# Patient Record
Sex: MALE | Race: WHITE | Employment: UNEMPLOYED | URBAN - METROPOLITAN AREA
[De-identification: names, ages, dates, MRNs, and addresses within clinical notes are randomized per-mention and may not be internally consistent; named-entity substitution may affect disease eponyms.]

---

## 2020-11-18 ENCOUNTER — ANESTHESIA EVENT (OUTPATIENT)
Dept: SURGERY | Age: 65
End: 2020-11-18
Payer: OTHER MISCELLANEOUS

## 2020-11-18 RX ORDER — CEFAZOLIN SODIUM/WATER 2 G/20 ML
2 SYRINGE (ML) INTRAVENOUS ONCE
Status: CANCELLED | OUTPATIENT
Start: 2020-11-18 | End: 2020-11-18

## 2020-11-20 ENCOUNTER — ANESTHESIA (OUTPATIENT)
Dept: SURGERY | Age: 65
End: 2020-11-20
Payer: OTHER MISCELLANEOUS

## 2020-11-20 ENCOUNTER — APPOINTMENT (OUTPATIENT)
Dept: GENERAL RADIOLOGY | Age: 65
End: 2020-11-20
Attending: ORTHOPAEDIC SURGERY
Payer: OTHER MISCELLANEOUS

## 2020-11-20 ENCOUNTER — HOSPITAL ENCOUNTER (OUTPATIENT)
Age: 65
Setting detail: OUTPATIENT SURGERY
Discharge: HOME | End: 2020-11-20
Attending: ORTHOPAEDIC SURGERY | Admitting: ORTHOPAEDIC SURGERY
Payer: OTHER MISCELLANEOUS

## 2020-11-20 VITALS
DIASTOLIC BLOOD PRESSURE: 78 MMHG | TEMPERATURE: 97.7 F | HEART RATE: 73 BPM | RESPIRATION RATE: 16 BRPM | SYSTOLIC BLOOD PRESSURE: 129 MMHG | OXYGEN SATURATION: 92 %

## 2020-11-20 PROCEDURE — 77030041490 HC NDL KT BN MRW ASPIR CLNG -B: Performed by: ORTHOPAEDIC SURGERY

## 2020-11-20 PROCEDURE — C1769 GUIDE WIRE: HCPCS | Performed by: ORTHOPAEDIC SURGERY

## 2020-11-20 PROCEDURE — 77030013921: Performed by: ORTHOPAEDIC SURGERY

## 2020-11-20 PROCEDURE — 77030006788 HC BLD SAW OSC STRY -B: Performed by: ORTHOPAEDIC SURGERY

## 2020-11-20 PROCEDURE — 74011000250 HC RX REV CODE- 250: Performed by: ORTHOPAEDIC SURGERY

## 2020-11-20 PROCEDURE — C1713 ANCHOR/SCREW BN/BN,TIS/BN: HCPCS | Performed by: ORTHOPAEDIC SURGERY

## 2020-11-20 PROCEDURE — 2709999900 HC NON-CHARGEABLE SUPPLY: Performed by: ORTHOPAEDIC SURGERY

## 2020-11-20 PROCEDURE — 77030002933 HC SUT MCRYL J&J -A: Performed by: ORTHOPAEDIC SURGERY

## 2020-11-20 PROCEDURE — 77030002916 HC SUT ETHLN J&J -A: Performed by: ORTHOPAEDIC SURGERY

## 2020-11-20 PROCEDURE — 74011250636 HC RX REV CODE- 250/636: Performed by: ORTHOPAEDIC SURGERY

## 2020-11-20 PROCEDURE — 74011250636 HC RX REV CODE- 250/636: Performed by: ANESTHESIOLOGY

## 2020-11-20 PROCEDURE — 76060000035 HC ANESTHESIA 2 TO 2.5 HR: Performed by: ORTHOPAEDIC SURGERY

## 2020-11-20 PROCEDURE — 77030000032 HC CUF TRNQT ZIMM -B: Performed by: ORTHOPAEDIC SURGERY

## 2020-11-20 PROCEDURE — 74011000250 HC RX REV CODE- 250: Performed by: NURSE ANESTHETIST, CERTIFIED REGISTERED

## 2020-11-20 PROCEDURE — 76210000063 HC OR PH I REC FIRST 0.5 HR: Performed by: ORTHOPAEDIC SURGERY

## 2020-11-20 PROCEDURE — 77030037713 HC CLOSR DEV INCIS ZIP STRY -B: Performed by: ORTHOPAEDIC SURGERY

## 2020-11-20 PROCEDURE — 76010000162 HC OR TIME 1.5 TO 2 HR INTENSV-TIER 1: Performed by: ORTHOPAEDIC SURGERY

## 2020-11-20 PROCEDURE — 76210000021 HC REC RM PH II 0.5 TO 1 HR: Performed by: ORTHOPAEDIC SURGERY

## 2020-11-20 PROCEDURE — 77030016449 HC BIT DRL AO1 STRY -C: Performed by: ORTHOPAEDIC SURGERY

## 2020-11-20 PROCEDURE — 76010010054 HC POST OP PAIN BLOCK: Performed by: ORTHOPAEDIC SURGERY

## 2020-11-20 PROCEDURE — 77030026245 HC BIT DRL AO S2 STRY -C: Performed by: ORTHOPAEDIC SURGERY

## 2020-11-20 PROCEDURE — 77030014383 HC ENDCAP SUPCNDYL STRY -C: Performed by: ORTHOPAEDIC SURGERY

## 2020-11-20 PROCEDURE — 77030003746: Performed by: ORTHOPAEDIC SURGERY

## 2020-11-20 PROCEDURE — 74011250637 HC RX REV CODE- 250/637: Performed by: ANESTHESIOLOGY

## 2020-11-20 PROCEDURE — 77030003602 HC NDL NRV BLK BBMI -B: Performed by: ANESTHESIOLOGY

## 2020-11-20 PROCEDURE — 77030008462 HC STPLR SKN PROX J&J -A: Performed by: ORTHOPAEDIC SURGERY

## 2020-11-20 PROCEDURE — 77030008846 HC WRE K STRY -C: Performed by: ORTHOPAEDIC SURGERY

## 2020-11-20 PROCEDURE — 76942 ECHO GUIDE FOR BIOPSY: CPT | Performed by: ORTHOPAEDIC SURGERY

## 2020-11-20 PROCEDURE — 77030020275 HC MISC ORTHOPEDIC: Performed by: ORTHOPAEDIC SURGERY

## 2020-11-20 PROCEDURE — 77030037087 HC DEV ART BMC PRP CLNG -H: Performed by: ORTHOPAEDIC SURGERY

## 2020-11-20 PROCEDURE — 77030031164 HC INST TU HUM T2 STRY -D: Performed by: ORTHOPAEDIC SURGERY

## 2020-11-20 PROCEDURE — 77030025281 HC SPLNT ORTHGLS 1 BSNM -B: Performed by: ORTHOPAEDIC SURGERY

## 2020-11-20 PROCEDURE — 77030021107 HC SHFT RMR MOD DISP STRY -D: Performed by: ORTHOPAEDIC SURGERY

## 2020-11-20 PROCEDURE — 74011250636 HC RX REV CODE- 250/636: Performed by: NURSE ANESTHETIST, CERTIFIED REGISTERED

## 2020-11-20 DEVICE — LOCKING SCREW, FULLY THREADED: Type: IMPLANTABLE DEVICE | Site: FOOT | Status: FUNCTIONAL

## 2020-11-20 DEVICE — LOCKING SCREW, PARTIALLY THREADED: Type: IMPLANTABLE DEVICE | Site: FOOT | Status: FUNCTIONAL

## 2020-11-20 DEVICE — ANKLE ARTHRODESIS NAIL, LEFT
Type: IMPLANTABLE DEVICE | Site: FOOT | Status: FUNCTIONAL
Brand: T2

## 2020-11-20 DEVICE — END CAP, SCN
Type: IMPLANTABLE DEVICE | Site: FOOT | Status: FUNCTIONAL
Brand: T2

## 2020-11-20 DEVICE — COMPRESSION SCREW CANNULATED
Type: IMPLANTABLE DEVICE | Site: FOOT | Status: FUNCTIONAL
Brand: T2

## 2020-11-20 RX ORDER — ONDANSETRON 2 MG/ML
4 INJECTION INTRAMUSCULAR; INTRAVENOUS ONCE
Status: DISCONTINUED | OUTPATIENT
Start: 2020-11-20 | End: 2020-11-20 | Stop reason: HOSPADM

## 2020-11-20 RX ORDER — NALOXONE HYDROCHLORIDE 0.4 MG/ML
0.1 INJECTION, SOLUTION INTRAMUSCULAR; INTRAVENOUS; SUBCUTANEOUS AS NEEDED
Status: DISCONTINUED | OUTPATIENT
Start: 2020-11-20 | End: 2020-11-20 | Stop reason: HOSPADM

## 2020-11-20 RX ORDER — PROPOFOL 10 MG/ML
INJECTION, EMULSION INTRAVENOUS AS NEEDED
Status: DISCONTINUED | OUTPATIENT
Start: 2020-11-20 | End: 2020-11-20 | Stop reason: HOSPADM

## 2020-11-20 RX ORDER — OXYCODONE HYDROCHLORIDE 5 MG/1
5 TABLET ORAL
Status: DISCONTINUED | OUTPATIENT
Start: 2020-11-20 | End: 2020-11-20 | Stop reason: HOSPADM

## 2020-11-20 RX ORDER — PROPOFOL 10 MG/ML
INJECTION, EMULSION INTRAVENOUS
Status: DISCONTINUED | OUTPATIENT
Start: 2020-11-20 | End: 2020-11-20 | Stop reason: HOSPADM

## 2020-11-20 RX ORDER — SODIUM CHLORIDE, SODIUM LACTATE, POTASSIUM CHLORIDE, CALCIUM CHLORIDE 600; 310; 30; 20 MG/100ML; MG/100ML; MG/100ML; MG/100ML
100 INJECTION, SOLUTION INTRAVENOUS CONTINUOUS
Status: DISCONTINUED | OUTPATIENT
Start: 2020-11-20 | End: 2020-11-20 | Stop reason: HOSPADM

## 2020-11-20 RX ORDER — ROPIVACAINE HYDROCHLORIDE 5 MG/ML
INJECTION, SOLUTION EPIDURAL; INFILTRATION; PERINEURAL
Status: COMPLETED | OUTPATIENT
Start: 2020-11-20 | End: 2020-11-20

## 2020-11-20 RX ORDER — DIPHENHYDRAMINE HYDROCHLORIDE 50 MG/ML
12.5 INJECTION, SOLUTION INTRAMUSCULAR; INTRAVENOUS ONCE
Status: DISCONTINUED | OUTPATIENT
Start: 2020-11-20 | End: 2020-11-20 | Stop reason: HOSPADM

## 2020-11-20 RX ORDER — SODIUM CHLORIDE 0.9 % (FLUSH) 0.9 %
5-40 SYRINGE (ML) INJECTION EVERY 8 HOURS
Status: DISCONTINUED | OUTPATIENT
Start: 2020-11-20 | End: 2020-11-20 | Stop reason: HOSPADM

## 2020-11-20 RX ORDER — MIDAZOLAM HYDROCHLORIDE 1 MG/ML
2 INJECTION, SOLUTION INTRAMUSCULAR; INTRAVENOUS ONCE
Status: COMPLETED | OUTPATIENT
Start: 2020-11-20 | End: 2020-11-20

## 2020-11-20 RX ORDER — SODIUM CHLORIDE 0.9 % (FLUSH) 0.9 %
5-40 SYRINGE (ML) INJECTION AS NEEDED
Status: DISCONTINUED | OUTPATIENT
Start: 2020-11-20 | End: 2020-11-20 | Stop reason: HOSPADM

## 2020-11-20 RX ORDER — MIDAZOLAM HYDROCHLORIDE 1 MG/ML
2 INJECTION, SOLUTION INTRAMUSCULAR; INTRAVENOUS
Status: DISCONTINUED | OUTPATIENT
Start: 2020-11-20 | End: 2020-11-20 | Stop reason: HOSPADM

## 2020-11-20 RX ORDER — EPHEDRINE SULFATE/0.9% NACL/PF 50 MG/5 ML
SYRINGE (ML) INTRAVENOUS AS NEEDED
Status: DISCONTINUED | OUTPATIENT
Start: 2020-11-20 | End: 2020-11-20 | Stop reason: HOSPADM

## 2020-11-20 RX ORDER — FENTANYL CITRATE 50 UG/ML
100 INJECTION, SOLUTION INTRAMUSCULAR; INTRAVENOUS ONCE
Status: COMPLETED | OUTPATIENT
Start: 2020-11-20 | End: 2020-11-20

## 2020-11-20 RX ORDER — HYDROMORPHONE HYDROCHLORIDE 2 MG/ML
0.5 INJECTION, SOLUTION INTRAMUSCULAR; INTRAVENOUS; SUBCUTANEOUS
Status: DISCONTINUED | OUTPATIENT
Start: 2020-11-20 | End: 2020-11-20 | Stop reason: HOSPADM

## 2020-11-20 RX ORDER — OXYCODONE HYDROCHLORIDE 5 MG/1
10 TABLET ORAL
Status: DISCONTINUED | OUTPATIENT
Start: 2020-11-20 | End: 2020-11-20 | Stop reason: HOSPADM

## 2020-11-20 RX ORDER — ACETAMINOPHEN 500 MG
1000 TABLET ORAL ONCE
Status: COMPLETED | OUTPATIENT
Start: 2020-11-20 | End: 2020-11-20

## 2020-11-20 RX ORDER — LIDOCAINE HYDROCHLORIDE 10 MG/ML
0.1 INJECTION INFILTRATION; PERINEURAL AS NEEDED
Status: DISCONTINUED | OUTPATIENT
Start: 2020-11-20 | End: 2020-11-20 | Stop reason: HOSPADM

## 2020-11-20 RX ORDER — HEPARIN 100 UNIT/ML
SYRINGE INTRAVENOUS AS NEEDED
Status: DISCONTINUED | OUTPATIENT
Start: 2020-11-20 | End: 2020-11-20 | Stop reason: HOSPADM

## 2020-11-20 RX ORDER — SODIUM CHLORIDE, SODIUM LACTATE, POTASSIUM CHLORIDE, CALCIUM CHLORIDE 600; 310; 30; 20 MG/100ML; MG/100ML; MG/100ML; MG/100ML
75 INJECTION, SOLUTION INTRAVENOUS CONTINUOUS
Status: DISCONTINUED | OUTPATIENT
Start: 2020-11-20 | End: 2020-11-20 | Stop reason: HOSPADM

## 2020-11-20 RX ADMIN — PROPOFOL 50 MG: 10 INJECTION, EMULSION INTRAVENOUS at 12:12

## 2020-11-20 RX ADMIN — MIDAZOLAM 2 MG: 1 INJECTION INTRAMUSCULAR; INTRAVENOUS at 10:53

## 2020-11-20 RX ADMIN — SODIUM CHLORIDE, SODIUM LACTATE, POTASSIUM CHLORIDE, AND CALCIUM CHLORIDE 100 ML/HR: 600; 310; 30; 20 INJECTION, SOLUTION INTRAVENOUS at 10:30

## 2020-11-20 RX ADMIN — ROPIVACAINE HYDROCHLORIDE 30 ML: 150 INJECTION, SOLUTION EPIDURAL; INFILTRATION; PERINEURAL at 11:06

## 2020-11-20 RX ADMIN — Medication 10 MG: at 12:47

## 2020-11-20 RX ADMIN — Medication 10 MG: at 12:52

## 2020-11-20 RX ADMIN — SODIUM CHLORIDE, SODIUM LACTATE, POTASSIUM CHLORIDE, AND CALCIUM CHLORIDE: 600; 310; 30; 20 INJECTION, SOLUTION INTRAVENOUS at 13:20

## 2020-11-20 RX ADMIN — FENTANYL CITRATE 100 MCG: 50 INJECTION, SOLUTION INTRAMUSCULAR; INTRAVENOUS at 10:53

## 2020-11-20 RX ADMIN — ROPIVACAINE HYDROCHLORIDE 20 ML: 150 INJECTION, SOLUTION EPIDURAL; INFILTRATION; PERINEURAL at 11:07

## 2020-11-20 RX ADMIN — PROPOFOL 140 MCG/KG/MIN: 10 INJECTION, EMULSION INTRAVENOUS at 12:12

## 2020-11-20 RX ADMIN — ACETAMINOPHEN 1000 MG: 500 TABLET, FILM COATED ORAL at 10:53

## 2020-11-20 RX ADMIN — CEFAZOLIN 3 G: 1 INJECTION, POWDER, FOR SOLUTION INTRAVENOUS at 12:13

## 2020-11-20 NOTE — ANESTHESIA PROCEDURE NOTES
Peripheral Block    Start time: 11/20/2020 10:59 AM  End time: 11/20/2020 11:02 AM  Performed by: Carrie Magallanes MD  Authorized by: Carrie Magallanes MD       Pre-procedure: Indications: at surgeon's request, post-op pain management and procedure for pain    Preanesthetic Checklist: patient identified, risks and benefits discussed, site marked, timeout performed, anesthesia consent given and patient being monitored    Timeout Time: 10:59          Block Type:   Block Type:   Adductor canal  Laterality:  Left  Monitoring:  Standard ASA monitoring, responsive to questions, oxygen, continuous pulse ox, frequent vital sign checks and heart rate  Injection Technique:  Single shot  Procedures: ultrasound guided    Patient Position: supine  Prep: chlorhexidine    Location:  Mid thigh  Needle Type:  Stimuplex  Needle Gauge:  22 G  Needle Localization:  Ultrasound guidance  Medication Injected:  Ropivacaine (PF) (NAROPIN)(0.5%) 5 mg/mL injection, 20 mL  Med Admin Time: 11/20/2020 11:07 AM    Assessment:  Number of attempts:  1  Injection Assessment:  Incremental injection every 5 mL, no paresthesia, ultrasound image on chart, no intravascular symptoms, negative aspiration for blood and local visualized surrounding nerve on ultrasound  Patient tolerance:  Patient tolerated the procedure well with no immediate complications

## 2020-11-20 NOTE — ANESTHESIA PREPROCEDURE EVALUATION
Relevant Problems   No relevant active problems       Anesthetic History   No history of anesthetic complications            Review of Systems / Medical History  Patient summary reviewed and pertinent labs reviewed    Pulmonary        Sleep apnea: No treatment           Neuro/Psych         Psychiatric history     Cardiovascular  Within defined limits                Exercise tolerance: >4 METS     GI/Hepatic/Renal  Within defined limits              Endo/Other        Morbid obesity     Other Findings              Physical Exam    Airway  Mallampati: II  TM Distance: 4 - 6 cm  Neck ROM: normal range of motion   Mouth opening: Normal     Cardiovascular    Rhythm: regular  Rate: normal         Dental  No notable dental hx       Pulmonary  Breath sounds clear to auscultation               Abdominal  GI exam deferred       Other Findings            Anesthetic Plan    ASA: 3  Anesthesia type: total IV anesthesia      Post-op pain plan if not by surgeon: peripheral nerve block single    Induction: Intravenous  Anesthetic plan and risks discussed with: Patient
H/O: hysterectomy  4/2012  S/P cholecystectomy    S/P D&C    s/p surgical excision of left eye Skin cancer  Bilateral lower eye lids

## 2020-11-20 NOTE — ANESTHESIA POSTPROCEDURE EVALUATION
Procedure(s):  LEFT FIBULAR OSTEOTOMY/ ACHILLES LENGTHENING/ TIBIOTALOCALCANEAL FUSION W/ CALCANEAL BONE MARROW ASPIRATE.    total IV anesthesia    Anesthesia Post Evaluation      Multimodal analgesia: multimodal analgesia used between 6 hours prior to anesthesia start to PACU discharge  Patient location during evaluation: bedside  Patient participation: complete - patient participated  Level of consciousness: responsive to verbal stimuli  Pain management: adequate  Airway patency: patent  Anesthetic complications: no  Cardiovascular status: hemodynamically stable  Respiratory status: spontaneous ventilation  Hydration status: stable    Final Post Anesthesia Temperature Assessment:  Normothermia (36.0-37.5 degrees C)      INITIAL Post-op Vital signs:   Vitals Value Taken Time   /81 11/20/2020  2:07 PM   Temp 36.5 °C (97.7 °F) 11/20/2020  2:07 PM   Pulse 71 11/20/2020  2:08 PM   Resp 16 11/20/2020  2:07 PM   SpO2 99 % 11/20/2020  2:08 PM   Vitals shown include unvalidated device data.

## 2020-11-20 NOTE — DISCHARGE INSTRUCTIONS
INSTRUCTIONS FOLLOWING FOOT SURGERY    ACTIVITY  Elevate foot. No Ice  Let the office know if dressing gets saturated with water . No weight bearing. Use crutches or knee walker until seen in follow up appointment  Don't put anything into the splint to relieve itching etc. Take one 325mg aspirin daily if okay with your medical doctor and you have no GI ulcer. Get up and out of bed frequently. While in bed move the legs as much as possible)      DRESSING CARE Keep dry and in place until follow up appointment    CALL YOUR DOCTOR IF YOU HAVE  Excessive bleeding that does not stop after holding mild pressure over the area. Temperature of 101 degrees or above. Redness, excessive swelling or bruising, and/or green or yellow, smelly discharge from incision. Loss of sensation - cold, white or blue toes. DIET  Day of Surgery: Clear liquids until no nausea or vomiting; then light, bland diet (Baked chicken, plain rice, grits, scrambled egg, toast). Nothing Greasy, fried or spicy today  Advance to regular diet on second day, unless your doctor orders otherwise. PAIN  Take pain medications as directed by your doctor. Call your doctor if pain is NOT relieved by medication. PAIN MED SIDE EFFECTS  Constipation: Lots of fluids, try prune juice, then OTC stool softeners if necessary  Nausea: Take medication with food. AFTER ANESTHESIA  For the first 24 hours and while taking narcotics for pain: DO NOT Drive, Drink Alcoholic beverages, or make important Decisions. Be aware of dizziness following anesthesia and while taking pain medication. Learning About a Peripheral Nerve Block  What is a peripheral nerve block? For some surgeries, a doctor may do a peripheral nerve block to numb the part of the body involved, often an arm or a leg. Peripheral nerves lead from the spinal cord to other parts of the body, carrying signals for movement and feeling.   The nerve block is sometimes used with medicine that makes you sleep during the surgery. Or the nerve block may be all that's needed, and you can stay awake without feeling any pain. The nerve block may also help keep your pain level lower after the surgery. How is a peripheral nerve block done? Before the nerve block, you will have a tube called an IV placed in your arm. This will be used to give you medicine to make you sleep or keep you comfortable. The doctor may use ultrasound, X-ray, or another imaging method to help guide the needle that will be used for the nerve block. After finding the right spot, the doctor uses a tiny needle to numb the skin. Then he or she puts the nerve block needle into the numbed area. If you are awake, you may feel some pressure. But you should not feel pain. What can you expect after a peripheral nerve block? The nerve block will leave the area that was numbed, like your arm or leg, partly or totally numb for a while. Your doctor will tell you for how long. Follow your doctor's instructions closely. If you'll be going home right after the surgery, you'll need someone to drive you. As the block wears off, you'll start to feel some pain from the surgery. Be sure to take your pain medicines before the pain gets bad. What are the risks of a peripheral nerve block? You will be closely watched during the nerve block. That's because the anesthetics used for the nerve block may affect the central nervous system, cardiovascular system, and respiratory system (airway and lungs). They may also affect blood pressure, breathing, heartbeat, and other vital functions. As the needle is put in place during the nerve block, the nerve to be blocked may be touched with the tip of the needle. If this happens, you may feel a sharp sensation like an electrical shock in the part of the body supplied by the nerve. Be sure to let your doctor know if you feel this. Problems after a nerve block are rare.  There is a small risk of seizures, heart problems, damage to nerves, infection, or bleeding. The benefits usually outweigh these risks. Follow-up care is a key part of your treatment and safety. Be sure to make and go to all appointments, and call your doctor if you are having problems. It's also a good idea to know your test results and keep a list of the medicines you take. Where can you learn more? Go to http://www.gray.com/  Enter A594 in the search box to learn more about \"Learning About a Peripheral Nerve Block. \"  Current as of: November 20, 2019               Content Version: 12.6  © 5846-1531 [employer]. Care instructions adapted under license by [employer] (which disclaims liability or warranty for this information). If you have questions about a medical condition or this instruction, always ask your healthcare professional. [address] any warranty or liability for your use of this information. Preventing Infection at Home  We care about preventing infection and avoiding the spread of germs - not only when you are in the hospital but also when you return home. When you return home from the hospital, its important to take the following steps to help prevent infection and avoid spreading germs that could infect you and others. Ask everyone in your home to follow these guidelines, too. Clean Your Hands  · Clean your hands whenever your hands are visibly dirty, before you eat, before or after touching your mouth, nose or eyes, and before preparing food. Clean them after contact with body fluids, using the restroom, touching animals or changing diapers. · When washing hands, wet them with warm water and work up a lather. Rub hands for at least 15 seconds, then rinse them and pat them dry with a clean towel or paper towel. · When using hand sanitizers, it should take about 15 seconds to rub your hands dry.  If not, you probably didnt apply enough . Cover Your Sneeze or Cough  Germs are released into the air whenever you sneeze or cough. To prevent the spread of infection:  · Turn away from other people before coughing or sneezing. · Cover your mouth or nose with a tissue when you cough or sneeze. Put the tissue in the trash. · If you dont have a tissue, cough or sneeze into your upper sleeve, not your hands. · Always clean your hands after coughing or sneezing. Care for Wounds  Your skin is your bodys first line of defense against germs, but an open wound leaves an easy way for germs to enter your body. To prevent infection:  · Clean your hands before and after changing wound dressings, and wear gloves to change dressings if recommended by your doctor. · Take special care with IV lines or other devices inserted into the body. If you must touch them, clean your hands first.  · Follow any specific instructions from your doctor to care for your wounds. Contact your doctor if you experience any signs of infection, such as fever or increased redness at the surgical or wound site. Keep a Clean Home  · Clean or wipe commonly touched hard surfaces like door handles, sinks, tabletops, phones and TV remotes. · Use products labeled disinfectant to kill harmful bacteria and viruses. · Use a clean cloth or paper towel to clean and dry surfaces. Wiping surfaces with a dirty dishcloth, sponge or towel will only spread germs. · Never share toothbrushes, hutchison, drinking glasses, utensils, razor blades, face cloths or bath towels to avoid spreading germs. · Be sure that the linens that you sleep on are clean. · Keep pets away from wounds and wash your hands after touching pets, their toys or bedding. We care about you and your health. Remember, preventing infections is a team effort between you, your family, friends and health care providers.      DISCHARGE SUMMARY from Nurse    PATIENT INSTRUCTIONS:    After general anesthesia or intravenous sedation, for 24 hours or while taking prescription Narcotics:  · Limit your activities  · Do not drive and operate hazardous machinery  · Do not make important personal or business decisions  · Do  not drink alcoholic beverages  · If you have not urinated within 8 hours after discharge, please contact your surgeon on call. *  Please give a list of your current medications to your Primary Care Provider. *  Please update this list whenever your medications are discontinued, doses are      changed, or new medications (including over-the-counter products) are added. *  Please carry medication information at all times in case of emergency situations. These are general instructions for a healthy lifestyle:    No smoking/ No tobacco products/ Avoid exposure to second hand smoke    Surgeon General's Warning:  Quitting smoking now greatly reduces serious risk to your health. Obesity, smoking, and sedentary lifestyle greatly increases your risk for illness    A healthy diet, regular physical exercise & weight monitoring are important for maintaining a healthy lifestyle    You may be retaining fluid if you have a history of heart failure or if you experience any of the following symptoms:  Weight gain of 3 pounds or more overnight or 5 pounds in a week, increased swelling in our hands or feet or shortness of breath while lying flat in bed. Please call your doctor as soon as you notice any of these symptoms; do not wait until your next office visit. Recognize signs and symptoms of STROKE:    F-face looks uneven    A-arms unable to move or move unevenly    S-speech slurred or non-existent    T-time-call 911 as soon as signs and symptoms begin-DO NOT go       Back to bed or wait to see if you get better-TIME IS BRAIN. Learning About How to Use Crutches  Your Care Instructions  Crutches can help you walk when you have an injured hip, leg, knee, ankle, or foot.  Your doctor will tell you how much weight--if any--you can put on your leg. Be sure your crutches fit you. When you stand up in your normal posture, there should be space for two or three fingers between the top of the crutch and your armpit. When you let your hands hang down, the hand  should be at your wrists. When you put your hands on the hand , your elbows should be slightly bent. To stay safe when using crutches:  · Look straight ahead, not down at your feet. · Clear away small rugs, cords, or anything else that could cause you to trip, slip, or fall. · Be very careful around pets and small children. They can get in your path when you least expect it. · Be sure the rubber tips on your crutches are clean and in good condition to help prevent slipping. · Avoid slick conditions, such as wet floors and snowy or icy driveways. In bad weather, be especially careful on curbs and steps. How to use crutches  Getting ready to walk    1. Bend your elbows slightly. Press the padded top parts of the crutches against your sides, under your armpits. 2. If you have been told not to put any weight on your injured leg, keep that leg bent and off the ground. Walking with crutches    1. Put both crutches about 12 inches in front of you. 2. Put your weight on the handgrips, not on the pads under your arms. (Constant pressure against your underarms can cause numbness.) Swing your body forward. (If you have been told not to put any weight on your injured leg, keep that leg bent and off the ground.)  3. To complete the step, put your weight on the strong leg. 4. Move your crutches about 12 inches in front of you, and start the next step. 5. When you're confident using the crutches, you can move the crutches and your injured leg at the same time. Then push straight down on the crutches as you step past the crutches with your strong leg, as you would in normal walking. 6. Take small steps. 7. Use ramps and elevators when you can. Sitting down    1.  To sit, back up to the chair. Use one hand to hold both crutches by the handgrips, beside your injured leg. With the other hand, hold onto the seat and slowly lower yourself onto the chair. 2. Lay the crutches on the ground near your chair. If you prop them up, they may fall over. Getting up from a chair    1. To get up from a chair,  the crutches and put them in one hand beside your injured leg. 2. Put your weight on the handgrips of the crutches and on your strong leg to stand up. Walking up stairs    1. To go up stairs, step up with your strong leg and then bring the crutches and your injured leg to the upper step. 2. For stairs that have handrails: Put both crutches under the arm opposite the handrail. Use the hand opposite the handrail to hold both crutches by the handgrips. 3. Hold onto the handrail as you go up. Put your strong leg on the step first when you go up. Walking down stairs    1. To go down stairs, put your crutches and injured leg on the lower step. 2. Bring your strong leg to the lower step. This saying may help you remember: \"Up with the good, down with the bad. \"  3. For stairs that have handrails: Put both crutches under the arm opposite the handrail. Use the hand opposite the handrail to hold both crutches by the handgrips. Hold onto the handrail as you go down. Follow the same process you use for stairs: Lead with your crutches and injured leg on the way down. Follow-up care is a key part of your treatment and safety. Be sure to make and go to all appointments, and call your doctor if you are having problems. It's also a good idea to know your test results and keep a list of the medicines you take. Where can you learn more? Go to http://www.gray.com/. Enter W482 in the search box to learn more about \"Learning About How to Use Crutches. \"  Current as of: August 4, 2016  Content Version: 11.2  © 5998-4967 [employer].  Care instructions adapted under license by [employer] (which disclaims liability or warranty for this information). If you have questions about a medical condition or this instruction, always ask your healthcare professional. [address] any warranty or liability for your use of this information. Learning About Coronavirus [phone])  Coronavirus [phone]): Overview  What is coronavirus (COVID-19)? The coronavirus disease (COVID-19) is caused by a virus. It is an illness that was first found in [place], [place], in December 2019. It has since spread worldwide. The virus can cause fever, cough, and trouble breathing. In severe cases, it can cause pneumonia and make it hard to breathe without help. It can cause death. Coronaviruses are a large group of viruses. They cause the common cold. They also cause more serious illnesses like [place] respiratory syndrome (MERS) and severe acute respiratory syndrome (SARS). COVID-19 is caused by a novel coronavirus. That means it's a new type that has not been seen in people before. This virus spreads person-to-person through droplets from coughing and sneezing. It can also spread when you are close to someone who is infected. And it can spread when you touch something that has the virus on it, such as a doorknob or a tabletop. What can you do to protect yourself from coronavirus (COVID-19)? The best way to protect yourself from getting sick is to:  · Avoid areas where there is an outbreak. · Avoid contact with people who may be infected. · Wash your hands often with soap or alcohol-based hand sanitizers. · Avoid crowds and try to stay at least 6 feet away from other people. · Wash your hands often, especially after you cough or sneeze. Use soap and water, and scrub for at least 20 seconds. If soap and water aren't available, use an alcohol-based hand . · Avoid touching your mouth, nose, and eyes.   What can you do to avoid spreading the virus to others? To help avoid spreading the virus to others:  · Cover your mouth with a tissue when you cough or sneeze. Then throw the tissue in the trash. · Use a disinfectant to clean things that you touch often. · Wear a cloth face cover if you have to go to public areas. · Stay home if you are sick or have been exposed to the virus. Don't go to school, work, or public areas. And don't use public transportation, ride-shares, or taxis unless you have no choice. · If you are sick:  ? Leave your home only if you need to get medical care. But call the doctor's office first so they know you're coming. And wear a face cover. ? Wear the face cover whenever you're around other people. It can help stop the spread of the virus when you cough or sneeze. ? Clean and disinfect your home every day. Use household  and disinfectant wipes or sprays. Take special care to clean things that you grab with your hands. These include doorknobs, remote controls, phones, and handles on your refrigerator and microwave. And don't forget countertops, tabletops, bathrooms, and computer keyboards. When to call for help  [id number] anytime you think you may need emergency care. For example, call if:  · You have severe trouble breathing. (You can't talk at all.)  · You have constant chest pain or pressure. · You are severely dizzy or lightheaded. · You are confused or can't think clearly. · Your face and lips have a blue color. · You pass out (lose consciousness) or are very hard to wake up. Call your doctor now if you develop symptoms such as:  · Shortness of breath. · Fever. · Cough. If you need to get care, call ahead to the doctor's office for instructions before you go. Make sure you wear a face cover to prevent exposing other people to the virus. Where can you get the latest information? The following health organizations are tracking and studying this virus. Their websites contain the most up-to-date information.  Renee Camilo also learn what to do if you think you may have been exposed to the virus. · U.S. Centers for Disease Control and Prevention (CDC): The CDC provides updated news about the disease and travel advice. The website also tells you how to prevent the spread of infection. www.cdc.gov  · World Health Organization [facility]): WHO offers information about the virus outbreaks. WHO also has travel advice. www.who.int  Current as of: May 8, 2020               Content Version: 12.5  © 2006-2020 [employer]. Care instructions adapted under license by [employer] (which disclaims liability or warranty for this information). If you have questions about a medical condition or this instruction, always ask your healthcare professional. [address] any warranty or liability for your use of this information.

## 2020-11-20 NOTE — OP NOTES
12/28/2018    Subjective:  Today's supervising provider is Dr. Meño Willisraina is a 86 year old female   referred by Dr. Nino  for follow up  anticoagulation management and continued education for the indications listed below. Today’s reason for visit is: Standard follow-up interval; last visit with Dr. Floyd: 9/14/18.   Previous INR was 1.8 on 12/20/18. Dose Maintained. Patient currently takes 4 mg daily of warfarin using 1mg tabs.    1.5-2.5    Today's Lab  Lab Results   Component Value Date    INR 1.9 12/28/2018     No components found for: WBINR    Plan:  1.) Reinforcement of education was provided on the following topics at this visit:   Proper administration (day, time relationship to meals, missed doses, etc.)., INR testing (importance and reason for repeated testing)., Signs/Symptoms of bleeding and reporting them to physician, Dietary intake of Vitamin K (dietary sources, other sources (i.e.Vitamins)., Drug Interactions (importance of notifying [facility] of all medication changes), Carrying Anticoagulation Identification (bracelets, necklaces or wallet cards)., Medications, over-the-counter drugs and supplements to avoid (unless approved by your physician)., Immediately contacting the [facility] if you are placed on antibiotic therapy for any reason., Contacting the [facility] seven days in advance for Warfarin refills. and Importance of compliance and the danger of self-adjusting your Warfarin doses.  2. Demi Ratliff advised to Maintain dose of 4 mg daily of Warfarin and to recheck INR in 10 days in [facility]agulation Clinic.   Patient has 1 mg tablets. Per patient, a warfarin refill is not needed.  Protocol was followed.  3. Demi Ratliff instructed to maintain the current Vitamin K intake  4. This Patient's current  Anticoagulation Clinic referral order is in effect from 5/29/18 - 5/29/19      Demi Ratliff was provided with verbal and written instructions and educational materials.  Demi HAGAN  FULL OP NOTE    PATIENT NAME: Eulalia Essex  MRN: [id number]    DATE OF SURGERY: 11/20/2020    PREOPERATIVE DIAGNOSIS: Aseptic necrosis of talus, left ([facility]) [M87.072]  Nonunion left tibial malleolus fracture    POSTOPERATIVE DIAGNOSIS: Aseptic necrosis of talus, left ([facility]) [M87.072]  Nonunion left medial malleolus fracture    PROCEDURE: 1. Left open ankle arthrodesis, 90905                             2.  Left subtalar joint arthrodesis, 03615                              3.  Open treatment of left medial malleolus distal tibial nonunion, 43269                              4.  Left fibular osteotomy, 22894                             5.  Left calcaneal bone marrow autograft harvest, 20900                             6.  Left triple hemisection tendo Achilles lengthening, 51233    SURGEON: Gray Zaman MD    HARDWARE:   Implant Name Type Inv.  Item Serial No.  Lot No. LRB No. Used Action   SCR BNE COMP SHEILA T2 ANKL 5X45 --  - [id number]  SCR BNE COMP SHEILA T2 ANKL 5X45 --   [employer] [facility] [id number] Left 1 Implanted   NAIL ANKL ARTH LT T2 [id number] -- STRL - [id number]  NAIL ANKL ARTH LT T2 [id number] -- STRL  [employer] [facility] [id number] Left 1 Implanted   SCR BNE SHFT LCK T2 5X40MM TI -- STRL - [id number]  SCR BNE SHFT LCK T2 5X40MM TI -- STRL  [employer] [facility] [id number] Left 1 Implanted   SCR BNE LCK T2 FT 5X32.5MM TI -- STRL - [id number]  SCR BNE LCK T2 FT 5X32.5MM TI -- STRL  [employer]_ [id number] Left 1 Implanted   SCREW BONE 5MM 35MM T2 TI - [id number]  SCREW BONE 5MM 35MM T2 TI  [employer]_ [id number] Left 1 Implanted   SCREW BONE 5MM 40MM T2 TI - [id number]  SCREW BONE 5MM 40MM T2 TI  [employer] [id number] Left 1 Implanted   SCR BNE LCK T2 FT 5X80MM TI -- STRL - [id number]  SCR BNE LCK T2 FT 5X80MM TI -- STRL  [employer]WD [id number] Left 1 Implanted   END CAP T2 THRD SCN --  - [id number]  END CAP T2 THRD SCN -- [id number] [id number] Left 1 Implanted     INDICATIONS: This patient is a 52-year-old male with a 3-year history of a left ankle injury sustaining a medial talus fracture nonunion as well as significant avascular necrosis with collapse of the talus. He is failed conservative therapy desires surgical treatment. Risks and benefits of the procedure including but not limited to risks of anesthetic complications as well as surgical complications including damage nerves and blood vessels, risk of infection, risk of incomplete pain relief, risk of malunion, risk of nonunion, possible need for lifetime bracing and need for additional surgery been discussed with the patient. He understands the risks and wishes to proceed with surgery at this time. PROCEDURE IN DETAIL: A time out was done to confirm the operating procedure, surgeon, patient and site. A block was placed by the department of anesthesia. During a preop surgical timeout the left lower extremity was identified its correct surgical site and prepped and draped in a standard sterile fashion using ChloraPrep solution. A large-bore needle was placed in the lateral wall of the calcaneus and bone marrow aspirate was obtained which was later spun down in a centrifuge and mixed with the patient's autograft in the arthrodesis sites. A triple hemisection tendo Achilles lengthening was then performed at that time through 3 small incisions in the Achilles. A lateral approach to the fibula was then opened at that time. A fibular osteotomy was performed and a portion of the distal fibula was then resected at that time and later used for autograft mixed with the patient's bone marrow aspirate. A medial approach to the medial talus nonunion was then opened. To address the nonunion of the distal tibia, the fracture fragment was then removed and excised at that time.   Both the tibiotalar and subtalar joints were prepared using a curette followed by drill Evy verbalized understanding.     More than 15 minutes was used in instructing and educating the patient on VKA therapy and management.       [unfilled]     bit and osteotomes. The autograft was then placed in the both arthrodesis sites. The hindfoot was reduced in the desired anatomic alignment, and a guidepin was placed from the plantar aspect of the calcaneus into the distal tibia under fluoroscopic guidance. At that point an inch reamer was then used to create a path into the distal tibia. The tibial canal was then reamed using reamers up to a size 12 mm. A hindfoot fusion aby was then advanced from the heel up into the distal tibia. This was locked throughout using appropriate length locking screws in maximal compression. The wounds were then irrigated and closed using Monocryl and staples throughout. Sterile dressings then applied followed by well-padded posterior splint. Anesthesia was discontinued. The patient was transferred back to recovery bed. He was taken recovery in satisfactory condition. He appeared to tolerate procedure well. There were no apparent surgical or anesthetic complications. All needle and sponge counts were correct. Postoperatively he be nonweightbearing in a splint taken aspirin 325 mg daily for DVT prophylaxis. TOURNIQUET TIME: Approx 83  minutes. SPECIMENS: none    ESTIMATED BLOOD LOSS: 100 mL.

## 2020-11-20 NOTE — BRIEF OP NOTE
Brief Postoperative Note    Patient: Malini Eid  YOB: 1955  MRN: [id number]    Date of Procedure: 11/20/2020     Pre-Op Diagnosis: Aseptic necrosis of talus, left ([facility] [address] [M87.072]    Post-Op Diagnosis: Same as preoperative diagnosis. Procedure(s):  LEFT FIBULAR OSTEOTOMY/ ACHILLES LENGTHENING/ TIBIOTALOCALCANEAL FUSION W/ CALCANEAL BONE MARROW ASPIRATE    Surgeon(s):  Modesta Mcdaniel MD    Surgical Assistant: None    Anesthesia: General     Estimated Blood Loss (mL): Minimal    Complications: None    Specimens: * No specimens in log *     Implants:   Implant Name Type Inv.  Item Serial No.  Lot No. LRB No. Used Action   SCR BNE COMP SHEILA T2 ANKL 5X45 --  - [id number]  SCR BNE COMP SHEILA T2 ANKL 5X45 --   [employer] [facility] [id number] Left 1 Implanted   NAIL ANKL ARTH LT T2 [id number] -- STRL - [id number]  NAIL ANKL ARTH LT T2 [id number] -- STRL  [employer] [facility] [id number] Left 1 Implanted   SCR BNE SHFT LCK T2 5X40MM TI -- STRL - [id number]  SCR BNE SHFT LCK T2 5X40MM TI -- STRL  [employer] [facility] [id number] Left 1 Implanted   SCR BNE LCK T2 FT 5X32.5MM TI -- STRL - [id number]  SCR BNE LCK T2 FT 5X32.5MM TI -- STRL  [employer] [facility] [id number] Left 1 Implanted   SCREW BONE 5MM 35MM T2 TI - [id number]  SCREW BONE 5MM 35MM T2 TI  [employer] [facility] [id number] Left 1 Implanted   SCREW BONE 5MM 40MM T2 TI - [id number]  SCREW BONE 5MM 40MM T2 TI  [employer] [facility] [id number] Left 1 Implanted   SCR BNE LCK T2 FT 5X80MM TI -- STRL - [id number]  SCR BNE LCK T2 FT 5X80MM TI -- STRL  [employer] [facility] [id number] Left 1 Implanted   END CAP T2 THRD SCN --  - [id number]  END CAP T2 THRD SCN --   [employer] [facility] [id number] Left 1 Implanted       Drains: * No LDAs found *    Findings:     Electronically Signed by Rashard Wei MD on 11/20/2020 at 4:01 PM

## 2020-11-20 NOTE — ANESTHESIA PROCEDURE NOTES
Peripheral Block    Start time: 11/20/2020 10:53 AM  End time: 11/20/2020 10:58 AM  Performed by: Brian Galvan MD  Authorized by: Brian Gavlan MD       Pre-procedure: Indications: at surgeon's request, post-op pain management and procedure for pain    Preanesthetic Checklist: patient identified, risks and benefits discussed, site marked, timeout performed, anesthesia consent given and patient being monitored    Timeout Time: 10:53          Block Type:   Block Type:  Popliteal  Laterality:  Left  Monitoring:  Standard ASA monitoring, responsive to questions, continuous pulse ox, oxygen, frequent vital sign checks and heart rate  Injection Technique:  Single shot  Procedures: ultrasound guided and nerve stimulator    Patient position: Lateral Decubitus.   Prep: chlorhexidine    Location:  Lower thigh  Needle Type:  Stimuplex  Needle Gauge:  22 G  Needle Localization:  Nerve stimulator and ultrasound guidance  Motor Response comment:   Motor Response: minimal motor response >0.4 mA   Medication Injected:  Ropivacaine (PF) (NAROPIN)(0.5%) 5 mg/mL injection, 30 mL  Med Admin Time: 11/20/2020 11:06 AM    Assessment:  Number of attempts:  1  Injection Assessment:  Incremental injection every 5 mL, no paresthesia, ultrasound image on chart, local visualized surrounding nerve on ultrasound, negative aspiration for blood and no intravascular symptoms  Patient tolerance:  Patient tolerated the procedure well with no immediate complications

## 2023-08-15 ENCOUNTER — TELEPHONE (OUTPATIENT)
Dept: ORTHOPEDIC SURGERY | Age: 68
End: 2023-08-15

## 2025-08-11 ENCOUNTER — TELEPHONE (OUTPATIENT)
Dept: ORTHOPEDIC SURGERY | Age: 70
End: 2025-08-11

## (undated) DEVICE — AMD ANTIMICROBIAL GAUZE SPONGES,12 PLY USP TYPE VII, 0.2% POLYHEXAMETHYLENE BIGUANIDE HCI (PHMB): Brand: CURITY

## (undated) DEVICE — SPECIMEN COLLECTION 4-PORT MANIFOLD: Brand: NEPTUNE 2

## (undated) DEVICE — PAD,ABDOMINAL,5"X9",ST,LF,25/BX: Brand: MEDLINE INDUSTRIES, INC.

## (undated) DEVICE — BNDG ELAS ESMARK 4INX12FT LF -- STRL

## (undated) DEVICE — BANDAGE COBAN 6 IN WND 6INX5YD FOAM

## (undated) DEVICE — Device

## (undated) DEVICE — DRAPE SHT 3 QTR PROXIMA 53X77 --

## (undated) DEVICE — SPLINT CAST W4XL30IN WHT THMB FBRGLS PRECUT INTLOK WRINKLE

## (undated) DEVICE — BANDAGE,GAUZE,BULKEE II,4.5"X4.1YD,STRL: Brand: MEDLINE

## (undated) DEVICE — SMALL VOLUME BONE MARROW ASPIRATION KIT: Brand: SPINESMITH FUSIONARY GRAFT DELIVERY SYSTEM

## (undated) DEVICE — GOWN,REINF,POLY,ECL,PP SLV,XL: Brand: MEDLINE

## (undated) DEVICE — PRECISION THIN (9.0 X 0.38 X 31.0MM)

## (undated) DEVICE — BNDG,ELSTC,MATRIX,STRL,4"X5YD,LF,HOOK&LP: Brand: MEDLINE

## (undated) DEVICE — DRILL, AO, STERILE

## (undated) DEVICE — NON-ADHERING DRESSING: Brand: ADAPTIC®

## (undated) DEVICE — PREP SKN CHLRAPRP APL 26ML STR --

## (undated) DEVICE — SPONGE LAP 18X18IN STRL -- 5/PK

## (undated) DEVICE — K-WIRE, STERILE
Type: IMPLANTABLE DEVICE | Site: FOOT | Status: NON-FUNCTIONAL
Removed: 2020-11-20

## (undated) DEVICE — FOOT & ANKLE SOFT DR WOMACK: Brand: MEDLINE INDUSTRIES, INC.

## (undated) DEVICE — SUTURE MCRYL SZ 3-0 L27IN ABSRB UD L19MM PS-2 3/8 CIR PRIM Y427H

## (undated) DEVICE — DRAPE XR C ARM 41X74IN LF --

## (undated) DEVICE — GUIDE WIRE, BALL-TIPPED, STERILE

## (undated) DEVICE — REAMER SHAFT, MOD.TRINKLE: Brand: BIXCUT

## (undated) DEVICE — TEFLON TUBE, STERILE

## (undated) DEVICE — SOLUTION IV 1000ML 0.9% SOD CHL

## (undated) DEVICE — BUTTON SWITCH PENCIL BLADE ELECTRODE, HOLSTER: Brand: EDGE

## (undated) DEVICE — REM POLYHESIVE ADULT PATIENT RETURN ELECTRODE: Brand: VALLEYLAB

## (undated) DEVICE — ZIP 16 SURGICAL SKIN CLOSURE DEVICE: Brand: ZIP 16 SURGICAL SKIN CLOSURE DEVICE

## (undated) DEVICE — ZIMMER® STERILE DISPOSABLE TOURNIQUET CUFF WITH PLC, DUAL PORT, SINGLE BLADDER, 18 IN. (46 CM)

## (undated) DEVICE — SPONGE GZ W4XL4IN COT 12 PLY TYP VII WVN C FLD DSGN

## (undated) DEVICE — LARGE TEAR CROSS CUT RASP (14.0 X 7.0MM)

## (undated) DEVICE — DRILL, OBLIQUE

## (undated) DEVICE — SUT ETHLN 3-0 18IN PS2 BLK --

## (undated) DEVICE — ART BMC PLUS PROCESSING KIT: Brand: CELLING ART BMC SYSTEM

## (undated) DEVICE — GUIDE WIRE, SMOOTH-TIPPED, STERILE